# Patient Record
Sex: FEMALE | Race: WHITE
[De-identification: names, ages, dates, MRNs, and addresses within clinical notes are randomized per-mention and may not be internally consistent; named-entity substitution may affect disease eponyms.]

---

## 2019-03-09 ENCOUNTER — HOSPITAL ENCOUNTER (EMERGENCY)
Dept: HOSPITAL 80 - FED | Age: 24
Discharge: HOME | End: 2019-03-09
Payer: MEDICAID

## 2019-03-09 VITALS — SYSTOLIC BLOOD PRESSURE: 123 MMHG | DIASTOLIC BLOOD PRESSURE: 97 MMHG

## 2019-03-09 DIAGNOSIS — G40.909: Primary | ICD-10-CM

## 2019-03-09 DIAGNOSIS — O99.350: ICD-10-CM

## 2019-03-09 DIAGNOSIS — Z79.899: ICD-10-CM

## 2019-03-09 DIAGNOSIS — E86.9: ICD-10-CM

## 2019-03-09 DIAGNOSIS — Z3A.12: ICD-10-CM

## 2019-03-09 LAB — PLATELET # BLD: 272 10^3/UL (ref 150–400)

## 2019-03-09 NOTE — EDPHY
HPI/HX/ROS/PE/MDM


Narrative: 





CHIEF COMPLAINT:  Seizure





HPI: The patient is a 23-year-old female with a history of seizure disorder.  

The patient states she was up at the [facility] last night and missed both her 

nighttime and morning dose of Lamictal and Keppra.  [place] apparently had a witnessed tonic-clonic seizure that 

lasted approximately 2 min.  Patient return to normal mental status.  She is 

here because she is approximately 12 weeks pregnant and is concerned about the 

baby.  She has been followed as an outpatient for this pregnancy and has 

already had at least 2 ultrasounds that were normal.  The patient denies recent 

trauma or illness.  She complains of left shoulder pain which she states is due 

to a chronic injury.





REVIEW OF SYSTEMS:


Aside from elements discussed in the HPI, a comprehensive 10-point review of 

systems was reviewed and is negative.





PMH:  Includes history of seizure disorder.





SOCIAL HISTORY:  Denies alcohol or drug abuse.





PHYSICAL EXAM:


General:Patient is alert, in no acute distress.


ENT:Eyes are normal to inspection.  ENT inspection normal.


Neck: Normal inspection.  Full range of motion.


Respiratory:No respiratory distress.  Breath sounds normal bilaterally.


Cardiovascular: Regular rate and rhythm.  Strong peripheral pulses.  Normal cap 

refill.


Abdomen:The abdomen is nontender to palpation. There are no peritoneal signs. 

There are normal bowel sounds.  Gravid.


Back: Normal to inspection.  No tenderness to palpation.


Skin: Normal color.  No rash.  Warm and dry.


Extremities: Normal appearance.  Full range of motion.  Tenderness to the 

lateral aspect shoulders present.  The patient is able to pull herself up in 

bed using that arm without difficulty.


Neuro: Oriented x3.  Normal motor function.  Normal sensory function.


ED Course: 





0930:  I performed a bedside US which shows an IUP of appropriate size with 

good fetal movement and HR of approximately 150bpm.





0945:  Patient informed me that she feels like she is about to have a seizure.  

Lamictal and Keppra have been ordered but are not yet available from pharmacy.  

I explained to the patient that we could give her a dose of lorazepam now but 

that this is Category D so could cause negative effects to her baby. She states 

she has had this medication before and wants to go ahead with it despite risks.

  While medication was being pulled up, patient noted to have short tonic-

clonic seizure. 





Patient observed post-seizure in the hospital for several hours with return to 

baseline and no complaints.  She refused further workup or observation and 

would like to be discharged.  I strongly recommended she follow-up with her 

OBGYN and Neurologist ASAP.


MDM: 





This patient presents with seizure in the setting of known epilepsy, likely 

brought on by non-compliance with anti-epileptic medication which patient 

admits to.  Her presentation is complicated by current pregnancy.  I think 

ecclampisa as primary etiology of seizure is much less likely, and her BP is 

normal.  FHR is normal.  Ultimately patient refused further workup or 

observation.





- Data Points


Laboratory Results: 


 Laboratory Results





 03/09/19 09:30 





 03/09/19 09:30 





 











  03/09/19 03/09/19 03/09/19





  09:30 09:30 09:30


 


WBC      8.91 10^3/uL 10^3/uL





     (3.80-9.50) 


 


RBC      4.12 10^6/uL L 10^6/uL





     (4.18-5.33) 


 


Hgb      13.1 g/dL g/dL





     (12.6-16.3) 


 


Hct      36.4 % L %





     (38.0-47.0) 


 


MCV      88.3 fL fL





     (81.5-99.8) 


 


MCH      31.8 pg pg





     (27.9-34.1) 


 


MCHC      36.0 g/dL g/dL





     (32.4-36.7) 


 


RDW      12.8 % %





     (11.5-15.2) 


 


Plt Count      272 10^3/uL 10^3/uL





     (150-400) 


 


MPV      9.4 fL fL





     (8.7-11.7) 


 


Neut % (Auto)      77.4 % H %





     (39.3-74.2) 


 


Lymph % (Auto)      13.1 % L %





     (15.0-45.0) 


 


Mono % (Auto)      6.6 % %





     (4.5-13.0) 


 


Eos % (Auto)      2.0 % %





     (0.6-7.6) 


 


Baso % (Auto)      0.3 % %





     (0.3-1.7) 


 


Nucleat RBC Rel Count      0.0 % %





     (0.0-0.2) 


 


Absolute Neuts (auto)      6.89 10^3/uL H 10^3/uL





     (1.70-6.50) 


 


Absolute Lymphs (auto)      1.17 10^3/uL 10^3/uL





     (1.00-3.00) 


 


Absolute Monos (auto)      0.59 10^3/uL 10^3/uL





     (0.30-0.80) 


 


Absolute Eos (auto)      0.18 10^3/uL 10^3/uL





     (0.03-0.40) 


 


Absolute Basos (auto)      0.03 10^3/uL 10^3/uL





     (0.02-0.10) 


 


Absolute Nucleated RBC      0.00 10^3/uL 10^3/uL





     (0-0.01) 


 


Immature Gran %      0.6 % %





     (0.0-1.1) 


 


Immature Gran #      0.05 10^3/uL 10^3/uL





     (0.00-0.10) 


 


Sodium    137 mEq/L mEq/L  





    (135-145)  


 


Potassium    4.0 mEq/L mEq/L  





    (3.5-5.2)  


 


Chloride    107 mEq/L mEq/L  





    ()  


 


Carbon Dioxide    21 mEq/l L mEq/l  





    (22-31)  


 


Anion Gap    9 mEq/L mEq/L  





    (6-14)  


 


BUN    7 mg/dL mg/dL  





    (7-23)  


 


Creatinine    0.5 mg/dL L mg/dL  





    (0.6-1.0)  


 


Estimated GFR    > 60   





    


 


Glucose    83 mg/dL mg/dL  





    ()  


 


Calcium    9.3 mg/dL mg/dL  





    (8.5-10.4)  


 


Urine Color  PALE YELLOW     





    


 


Urine Appearance  CLEAR     





    


 


Urine pH  7.0     





   (5.0-7.5)   


 


Ur Specific Gravity  1.010     





   (1.002-1.030)   


 


Urine Protein  NEGATIVE     





   (NEGATIVE)   


 


Urine Ketones  NEGATIVE     





   (NEGATIVE)   


 


Urine Blood  NEGATIVE     





   (NEGATIVE)   


 


Urine Nitrate  NEGATIVE     





   (NEGATIVE)   


 


Urine Bilirubin  NEGATIVE     





   (NEGATIVE)   


 


Urine Urobilinogen  NEGATIVE EU EU    





   (0.2-1.0)   


 


Ur Leukocyte Esterase  NEGATIVE     





   (NEGATIVE)   


 


Urine Glucose  NEGATIVE     





   (NEGATIVE)   











Medications Given: 


 








Discontinued Medications





Sodium Chloride (Ns)  1,000 mls @ 0 mls/hr IV EDNOW ONE; Wide Open


   PRN Reason: Protocol


   Stop: 03/09/19 09:20


   Last Admin: 03/09/19 09:31 Dose:  1,000 mls


Lamotrigine (Lamictal)  300 mg PO EDNOW ONE


   Stop: 03/09/19 09:28


   Last Admin: 03/09/19 10:05 Dose:  300 mg


Levetiracetam (Keppra)  500 mg PO EDNOW ONE


   Stop: 03/09/19 09:28


   Last Admin: 03/09/19 09:38 Dose:  500 mg


Lorazepam (Ativan Injection)  1 mg IVP EDNOW ONE


   Stop: 03/09/19 09:45


   Last Admin: 03/09/19 09:48 Dose:  1 mg








General


Time Seen by Provider: 03/09/19 08:45


Initial Vital Signs: 


 Initial Vital Signs











Temperature (C)  36.5 C   03/09/19 08:38


 


Heart Rate  120 H  03/09/19 08:38


 


Respiratory Rate  24 H  03/09/19 08:38


 


Blood Pressure  126/85 H  03/09/19 08:38


 


O2 Sat (%)  99   03/09/19 08:38








 











O2 Delivery Mode               Room Air


 


O2 (L/minute)                  2














Allergies/Adverse Reactions: 


 





No Known Allergies Allergy (Unverified 03/09/19 08:37)


 








Home Medications: 














 Medication  Instructions  Recorded


 


Keppra  03/09/19


 


LaMICtal  03/09/19














Departure





- Departure


Disposition: Home, Routine, Self-Care


Clinical Impression: 


 Seizure disorder, Pregnancy, Seizure





Condition: Good


Instructions:  Epilepsy (DC)


Additional Instructions: 


Follow-up with your OBGYN within 72 hr.  Return to the emergency department 

immediately for recurrent seizure, headache, vomiting, abdominal pain, fever or 

other concerns.


Referrals: 


KRISTEL CHURCHILL [Primary Care Provider] - As per Instructions